# Patient Record
Sex: MALE | Race: WHITE
[De-identification: names, ages, dates, MRNs, and addresses within clinical notes are randomized per-mention and may not be internally consistent; named-entity substitution may affect disease eponyms.]

---

## 2018-02-02 ENCOUNTER — HOSPITAL ENCOUNTER (EMERGENCY)
Dept: HOSPITAL 47 - EC | Age: 33
Discharge: HOME | End: 2018-02-02
Payer: COMMERCIAL

## 2018-02-02 VITALS — SYSTOLIC BLOOD PRESSURE: 111 MMHG | DIASTOLIC BLOOD PRESSURE: 64 MMHG | HEART RATE: 82 BPM | TEMPERATURE: 97.4 F

## 2018-02-02 VITALS — RESPIRATION RATE: 18 BRPM

## 2018-02-02 DIAGNOSIS — F17.200: ICD-10-CM

## 2018-02-02 DIAGNOSIS — R19.7: Primary | ICD-10-CM

## 2018-02-02 DIAGNOSIS — Z88.7: ICD-10-CM

## 2018-02-02 DIAGNOSIS — Z79.899: ICD-10-CM

## 2018-02-02 DIAGNOSIS — J06.9: ICD-10-CM

## 2018-02-02 LAB
ALBUMIN SERPL-MCNC: 4.5 G/DL (ref 3.5–5)
ALP SERPL-CCNC: 96 U/L (ref 38–126)
ALT SERPL-CCNC: 52 U/L (ref 21–72)
AMYLASE SERPL-CCNC: 58 U/L (ref 30–110)
ANION GAP SERPL CALC-SCNC: 14 MMOL/L
AST SERPL-CCNC: 28 U/L (ref 17–59)
BASOPHILS # BLD AUTO: 0.1 K/UL (ref 0–0.2)
BASOPHILS NFR BLD AUTO: 1 %
BUN SERPL-SCNC: 14 MG/DL (ref 9–20)
CALCIUM SPEC-MCNC: 9.4 MG/DL (ref 8.4–10.2)
CHLORIDE SERPL-SCNC: 93 MMOL/L (ref 98–107)
CO2 SERPL-SCNC: 34 MMOL/L (ref 22–30)
EOSINOPHIL # BLD AUTO: 0.1 K/UL (ref 0–0.7)
EOSINOPHIL NFR BLD AUTO: 1 %
ERYTHROCYTE [DISTWIDTH] IN BLOOD BY AUTOMATED COUNT: 5.34 M/UL (ref 4.3–5.9)
ERYTHROCYTE [DISTWIDTH] IN BLOOD: 13.7 % (ref 11.5–15.5)
GLUCOSE SERPL-MCNC: 103 MG/DL (ref 74–99)
HCT VFR BLD AUTO: 44.7 % (ref 39–53)
HGB BLD-MCNC: 16 GM/DL (ref 13–17.5)
HYALINE CASTS UR QL AUTO: 3 /LPF (ref 0–2)
LIPASE SERPL-CCNC: 68 U/L (ref 23–300)
LYMPHOCYTES # SPEC AUTO: 1.1 K/UL (ref 1–4.8)
LYMPHOCYTES NFR SPEC AUTO: 23 %
MCH RBC QN AUTO: 30 PG (ref 25–35)
MCHC RBC AUTO-ENTMCNC: 35.8 G/DL (ref 31–37)
MCV RBC AUTO: 83.8 FL (ref 80–100)
MONOCYTES # BLD AUTO: 0.4 K/UL (ref 0–1)
MONOCYTES NFR BLD AUTO: 8 %
NEUTROPHILS # BLD AUTO: 3.2 K/UL (ref 1.3–7.7)
NEUTROPHILS NFR BLD AUTO: 64 %
PH UR: 6 [PH] (ref 5–8)
PLATELET # BLD AUTO: 211 K/UL (ref 150–450)
POTASSIUM SERPL-SCNC: 3.3 MMOL/L (ref 3.5–5.1)
PROT SERPL-MCNC: 7.3 G/DL (ref 6.3–8.2)
PROT UR QL: (no result)
RBC UR QL: 3 /HPF (ref 0–5)
SODIUM SERPL-SCNC: 141 MMOL/L (ref 137–145)
SP GR UR: 1.02 (ref 1–1.03)
UROBILINOGEN UR QL STRIP: <2 MG/DL (ref ?–2)
WBC # BLD AUTO: 4.9 K/UL (ref 3.8–10.6)
WBC #/AREA URNS HPF: 2 /HPF (ref 0–5)

## 2018-02-02 PROCEDURE — 87324 CLOSTRIDIUM AG IA: CPT

## 2018-02-02 PROCEDURE — 85025 COMPLETE CBC W/AUTO DIFF WBC: CPT

## 2018-02-02 PROCEDURE — 96360 HYDRATION IV INFUSION INIT: CPT

## 2018-02-02 PROCEDURE — 87046 STOOL CULTR AEROBIC BACT EA: CPT

## 2018-02-02 PROCEDURE — 82150 ASSAY OF AMYLASE: CPT

## 2018-02-02 PROCEDURE — 83690 ASSAY OF LIPASE: CPT

## 2018-02-02 PROCEDURE — 99284 EMERGENCY DEPT VISIT MOD MDM: CPT

## 2018-02-02 PROCEDURE — 87040 BLOOD CULTURE FOR BACTERIA: CPT

## 2018-02-02 PROCEDURE — 36415 COLL VENOUS BLD VENIPUNCTURE: CPT

## 2018-02-02 PROCEDURE — 87045 FECES CULTURE AEROBIC BACT: CPT

## 2018-02-02 PROCEDURE — 81001 URINALYSIS AUTO W/SCOPE: CPT

## 2018-02-02 PROCEDURE — 80053 COMPREHEN METABOLIC PANEL: CPT

## 2018-02-02 NOTE — ED
Nausea/Vomiting/Diarrhea HPI





- General


Chief complaint: Nausea/Vomiting/Diarrhea


Stated complaint: DIARRHEA, COLD SYMPTOMS


Time Seen by Provider: 02/02/18 20:58


Source: patient


Mode of arrival: ambulatory


Limitations: no limitations





- History of Present Illness


Initial comments: 





This is a 32-year-old male who presents to the ED with a chief complaint of 

diarrhea and upper respiratory infection symptoms. His history is significant 

for recent travel to [place], which he returned from today. He states having 

head and nasal congestion, sore throat and a fever 5 days ago. He recalls 

visual hallucinations associated with the fever. The symptoms have been 

resolving, but he states feeling congested still. He reports the onset of 

diarrhea was 3 days ago and is watery in nature, occurring at least 4-5 times 

per day. He denies loss of appetite or abdominal pain. He has a history of 

Campylobacter infection causing diarrhea from a previous travel. 





- Related Data


 Home Medications











 Medication  Instructions  Recorded  Confirmed


 


Acetaminophen [Tylenol Extra 1,000 mg PO Q6H PRN 02/02/18 02/02/18





Strength]   


 


Acyclovir [Zovirax] 400 mg PO DAILY 02/02/18 02/02/18


 


Atenolol/Chlorthalidone 0.5 tab PO DAILY 02/02/18 02/02/18





[Atenolol-Chlorthalidone 50-25]   


 


Atorvastatin [Lipitor] 20 mg PO HS 02/02/18 02/02/18


 


Guaifenesin/Pseudoephedrne HCl 1 tab PO BID PRN 02/02/18 02/02/18





[Mucinex D ER Tablet]   


 


L.acidoph,Paracasei, B.lactis 1 cap PO DAILY 02/02/18 02/02/18





[Probiotic]   


 


Loperamide HCl [Imodium A-D] 2 mg PO Q6H PRN 02/02/18 02/02/18








 Previous Rx's











 Medication  Instructions  Recorded


 


Ciprofloxacin HCl [Cipro] 500 mg PO Q12HR #14 tablet 02/02/18











 Allergies











Allergy/AdvReac Type Severity Reaction Status Date / Time


 


pertussis vaccine,adsorbed Allergy  Rash/Hives Verified 02/02/18 18:11














Review of Systems


ROS Statement: 


Those systems with pertinent positive or pertinent negative responses have been 

documented in the HPI.





ROS Other: All systems not noted in ROS Statement are negative.





Past Medical History


Past Medical History: No Reported History


History of Any Multi-Drug Resistant Organisms: None Reported


Past Surgical History: No Surgical Hx Reported


Past Psychological History: No Psychological Hx Reported


Smoking Status: Current every day smoker


Past Alcohol Use History: Occasional


Past Drug Use History: None Reported





General Exam


Limitations: no limitations


General appearance: alert, in no apparent distress


Head exam: Present: atraumatic, normocephalic, normal inspection


Respiratory exam: Present: normal lung sounds bilaterally.  Absent: respiratory 

distress, wheezes, rales, rhonchi, stridor


Cardiovascular Exam: Present: regular rate, normal rhythm, normal heart sounds.

  Absent: systolic murmur, diastolic murmur, rubs, gallop, clicks


GI/Abdominal exam: Present: soft, normal bowel sounds.  Absent: distended, 

tenderness, guarding, rebound, rigid, organomegaly, mass


Back exam: Present: normal inspection


Neurological exam: Present: alert, oriented X3, CN II-XII intact


Psychiatric exam: Present: normal affect, normal mood


Skin exam: Present: warm, dry, intact, normal color.  Absent: rash





Course


 Vital Signs











  02/02/18 02/02/18





  18:04 21:35


 


Temperature 98.7 F 97.6 F


 


Pulse Rate 78 85


 


Respiratory 16 18





Rate  


 


Blood Pressure 152/94 134/75


 


O2 Sat by Pulse 98 99





Oximetry  














Medical Decision Making





- Medical Decision Making





32-year-old male present emergency from for diarrhea, clicks symptoms.  Patient 

has been traveling to multiple countries.  Patient's having 4-5 episodes of 

loose watery diarrhea.  Concern for possible traveler's diarrhea or other 

infections.  Patient be discharged with ciprofloxacin will be given potassium 

here advised increase fluids, fruits and vegetables.  He was given prescription 

for stool culture, C. diff testing.





- Lab Data


Result diagrams: 


 02/02/18 21:30





 02/02/18 21:30


 Lab Results











  02/02/18 02/02/18 02/02/18 Range/Units





  21:30 21:30 21:30 


 


WBC   4.9   (3.8-10.6)  k/uL


 


RBC   5.34   (4.30-5.90)  m/uL


 


Hgb   16.0   (13.0-17.5)  gm/dL


 


Hct   44.7   (39.0-53.0)  %


 


MCV   83.8   (80.0-100.0)  fL


 


MCH   30.0   (25.0-35.0)  pg


 


MCHC   35.8   (31.0-37.0)  g/dL


 


RDW   13.7   (11.5-15.5)  %


 


Plt Count   211   (150-450)  k/uL


 


Neutrophils %   64   %


 


Lymphocytes %   23   %


 


Monocytes %   8   %


 


Eosinophils %   1   %


 


Basophils %   1   %


 


Neutrophils #   3.2   (1.3-7.7)  k/uL


 


Lymphocytes #   1.1   (1.0-4.8)  k/uL


 


Monocytes #   0.4   (0-1.0)  k/uL


 


Eosinophils #   0.1   (0-0.7)  k/uL


 


Basophils #   0.1   (0-0.2)  k/uL


 


Sodium  141    (137-145)  mmol/L


 


Potassium  3.3 L    (3.5-5.1)  mmol/L


 


Chloride  93 L    ()  mmol/L


 


Carbon Dioxide  34 H    (22-30)  mmol/L


 


Anion Gap  14    mmol/L


 


BUN  14    (9-20)  mg/dL


 


Creatinine  0.90    (0.66-1.25)  mg/dL


 


Est GFR (MDRD) Af Amer  >60    (>60 ml/min/1.73 sqM)  


 


Est GFR (MDRD) Non-Af  >60    (>60 ml/min/1.73 sqM)  


 


Glucose  103 H    (74-99)  mg/dL


 


Calcium  9.4    (8.4-10.2)  mg/dL


 


Total Bilirubin  0.6    (0.2-1.3)  mg/dL


 


AST  28    (17-59)  U/L


 


ALT  52    (21-72)  U/L


 


Alkaline Phosphatase  96    ()  U/L


 


Total Protein  7.3    (6.3-8.2)  g/dL


 


Albumin  4.5    (3.5-5.0)  g/dL


 


Amylase  58    ()  U/L


 


Lipase  68    ()  U/L


 


Urine Color    Yellow  


 


Urine Appearance    Clear  (Clear)  


 


Urine pH    6.0  (5.0-8.0)  


 


Ur Specific Gravity    1.017  (1.001-1.035)  


 


Urine Protein    1+ H  (Negative)  


 


Urine Glucose (UA)    Negative  (Negative)  


 


Urine Ketones    Negative  (Negative)  


 


Urine Blood    Negative  (Negative)  


 


Urine Nitrite    Negative  (Negative)  


 


Urine Bilirubin    Negative  (Negative)  


 


Urine Urobilinogen    <2.0  (<2.0)  mg/dL


 


Ur Leukocyte Esterase    Negative  (Negative)  


 


Urine RBC    3  (0-5)  /hpf


 


Urine WBC    2  (0-5)  /hpf


 


Amorphous Sediment    Occasional H  (None)  /hpf


 


Hyaline Casts    3 H  (0-2)  /lpf


 


Urine Mucus    Few H  (None)  /hpf


 


Urine Sperm    Occasional H  (None)  /hpf














Disposition


Clinical Impression: 


 Traveler's diarrhea, URI (upper respiratory infection)





Disposition: HOME SELF-CARE


Condition: Stable


Instructions:  Acute Diarrhea (ED)


Additional Instructions: 


Please return to the Emergency Department if symptoms worsen or any other 

concerns.


Prescriptions: 


Ciprofloxacin HCl [Cipro] 500 mg PO Q12HR #14 tablet


Referrals: 


Vamsi Henriquez DO [Primary Care Provider] - 1-2 days


Time of Disposition: 22:32